# Patient Record
Sex: MALE | Race: BLACK OR AFRICAN AMERICAN | Employment: UNEMPLOYED | URBAN - METROPOLITAN AREA
[De-identification: names, ages, dates, MRNs, and addresses within clinical notes are randomized per-mention and may not be internally consistent; named-entity substitution may affect disease eponyms.]

---

## 2021-10-25 ENCOUNTER — HOSPITAL ENCOUNTER (EMERGENCY)
Age: 31
Discharge: HOME | End: 2021-10-25
Attending: STUDENT IN AN ORGANIZED HEALTH CARE EDUCATION/TRAINING PROGRAM

## 2021-10-25 VITALS
HEART RATE: 90 BPM | TEMPERATURE: 98 F | SYSTOLIC BLOOD PRESSURE: 115 MMHG | OXYGEN SATURATION: 98 % | DIASTOLIC BLOOD PRESSURE: 101 MMHG

## 2021-10-25 DIAGNOSIS — K08.89 PAIN, DENTAL: Primary | ICD-10-CM

## 2021-10-25 PROCEDURE — 99282 EMERGENCY DEPT VISIT SF MDM: CPT

## 2021-10-25 RX ORDER — OXYCODONE AND ACETAMINOPHEN 5; 325 MG/1; MG/1
1 TABLET ORAL
Qty: 15 TABLET | Refills: 0 | Status: SHIPPED | OUTPATIENT
Start: 2021-10-25 | End: 2021-10-29

## 2021-10-25 RX ORDER — OXYCODONE AND ACETAMINOPHEN 5; 325 MG/1; MG/1
1 TABLET ORAL
Qty: 15 TABLET | Refills: 0 | Status: SHIPPED | OUTPATIENT
Start: 2021-10-25 | End: 2021-10-25 | Stop reason: SDUPTHER

## 2021-10-25 NOTE — ED PROVIDER NOTES
EMERGENCY DEPARTMENT HISTORY AND PHYSICAL EXAM    1:32 AM    Date: 10/25/2021  Patient Name: Maye Smith    History of Presenting Illness     Chief Complaint   Patient presents with    Dental Pain       History Provided By: Patient  Location/Duration/Severity/Modifying factors   GAMAL Smith is a 27 y.o. male no past medical problems presenting for evaluation of dental pain. He says that for the last 3 to 4 days he has had pain in the right lower premolar area after biting on something hard and feeling a crack. He thinks that he may have chipped his tooth. The pain is currently 4/10 in severity, throbbing. He has tried OTC meds without significant relief. He filled a prescription that he had for Augmentin and started taking it 2 days ago, and thinks that things might slowly be getting better. He has a appointment with the dentist coming up in 5 days but comes in this morning because he was unable to sleep due to the pain. Denies fevers, difficulty swallowing, other concerning symptoms. PCP: None    Current Outpatient Medications   Medication Sig Dispense Refill    oxyCODONE-acetaminophen (Percocet) 5-325 mg per tablet Take 1 Tablet by mouth every six (6) hours as needed for Pain for up to 4 days. Max Daily Amount: 4 Tablets. 15 Tablet 0       Past History     Past Medical History:  No past medical history on file. Past Surgical History:  No past surgical history on file. Family History:  No family history on file. Social History:  Social History     Tobacco Use    Smoking status: Not on file   Substance Use Topics    Alcohol use: Not on file    Drug use: Not on file       Allergies:  No Known Allergies    I reviewed and confirmed the above information with patient and updated as necessary. Review of Systems     Review of Systems   Constitutional: Negative for diaphoresis and fever. HENT: Positive for dental problem. Negative for ear pain and sore throat.     Eyes:        No acute change in vision   Respiratory: Negative for cough and shortness of breath. Cardiovascular: Negative for chest pain and leg swelling. Gastrointestinal: Negative for abdominal pain and vomiting. Genitourinary: Negative for dysuria. Musculoskeletal: Negative for neck pain. Skin: Negative for wound. Neurological: Negative for weakness and headaches. Physical Exam     Visit Vitals  BP (!) 115/101   Pulse 90   Temp 98 °F (36.7 °C)   SpO2 98%       Physical Exam  Vitals and nursing note reviewed. Constitutional:       Appearance: Normal appearance. He is not ill-appearing. HENT:      Mouth/Throat:      Mouth: Mucous membranes are moist.      Comments: Partially chipped right lower premolar, no dentin exposed. Floor the mouth is soft, no drainable lesions  Eyes:      Pupils: Pupils are equal, round, and reactive to light. Cardiovascular:      Rate and Rhythm: Normal rate and regular rhythm. Pulses: Normal pulses. Heart sounds: Normal heart sounds. Pulmonary:      Effort: Pulmonary effort is normal.      Breath sounds: Normal breath sounds. Abdominal:      General: Abdomen is flat. Tenderness: There is no abdominal tenderness. Musculoskeletal:         General: No swelling. Normal range of motion. Cervical back: Normal range of motion. Skin:     General: Skin is warm. Neurological:      General: No focal deficit present. Mental Status: He is alert and oriented to person, place, and time. Diagnostic Study Results     Labs -  No results found for this or any previous visit (from the past 24 hour(s)). Radiologic Studies -   No orders to display           Medical Decision Making   I am the first provider for this patient. I reviewed the vital signs, available nursing notes, past medical history, past surgical history, family history and social history. Vital Signs-Reviewed the patient's vital signs.     Records Reviewed: Nursing Notes and Old Medical Records (Time of Review: 1:32 AM)    Provider Notes (Medical Decision Making):   MDM  79-year-old male here with dental pain, no evidence of a drainable lesion or abscess. Likely due to a chipped tooth, dental carry. No fevers, difficulty swallowing, no evidence of Alexander's angina    ED Course: Progress Notes, Reevaluation, and Consults:  Patient arrives afebrile, slight hypertensive but otherwise hemodynamically normal  Exam is reassuring, no drainable abscesses, floor the mouth is soft    Patient is already on Augmentin, recently filled the prescription. He will take it twice a day for 10 days and has 20 pills with which to complete. He will use Motrin to manage most of his pain, have written him a short course of Percocet to manage breakthrough pain as he has an appointment next Friday. Signs and symptoms prompting return to the ED were discussed 2. Discharged home in stable condition. Diagnosis     Clinical Impression:   1. Pain, dental        Disposition: Home    Follow-up Information     Follow up With Specialties Details Why [address] [facility] EMERGENCY DEPT Emergency Medicine  As needed, If symptoms worsen [address] [place] [id number]  [phone]           Patient's Medications   Start Taking    OXYCODONE-ACETAMINOPHEN (PERCOCET) 5-325 MG PER TABLET    Take 1 Tablet by mouth every six (6) hours as needed for Pain for up to 4 days. Max Daily Amount: 4 Tablets. Continue Taking    No medications on file   These Medications have changed    No medications on file   Stop Taking    No medications on file       Karin Olmedo MD   Emergency Medicine   October 25, 2021, 1:32 AM     This note is dictated utilizing Dragon voice recognition software. Unfortunately this leads to occasional typographical errors using the voice recognition. I apologize in advance if the situation occurs. If questions occur please do not hesitate to contact me directly.     Anton Azevedo MD

## 2021-10-25 NOTE — DISCHARGE INSTRUCTIONS
Please go to your dentist appointment this coming Friday. Use Motrin, up to 600 mg every 4-6 hours to help manage your pain. If you have breakthrough pain, use one of the Percocet tablets. Continue to take your Augmentin. Return to emergency department as needed.

## 2022-01-16 NOTE — ED NOTES
I have reviewed discharge instructions with the patient. The patient verbalized understanding. No further questions at this time.
No